# Patient Record
Sex: MALE | URBAN - METROPOLITAN AREA
[De-identification: names, ages, dates, MRNs, and addresses within clinical notes are randomized per-mention and may not be internally consistent; named-entity substitution may affect disease eponyms.]

---

## 2024-10-10 ENCOUNTER — OFFICE VISIT (OUTPATIENT)
Dept: PRIMARY CARE | Facility: CLINIC | Age: 24
End: 2024-10-10
Payer: COMMERCIAL

## 2024-10-10 VITALS
DIASTOLIC BLOOD PRESSURE: 68 MMHG | BODY MASS INDEX: 32.85 KG/M2 | HEIGHT: 74 IN | SYSTOLIC BLOOD PRESSURE: 128 MMHG | WEIGHT: 256 LBS

## 2024-10-10 DIAGNOSIS — R00.2 PALPITATION: ICD-10-CM

## 2024-10-10 DIAGNOSIS — I10 HYPERTENSION, UNSPECIFIED TYPE: ICD-10-CM

## 2024-10-10 DIAGNOSIS — K21.9 GASTROESOPHAGEAL REFLUX DISEASE, UNSPECIFIED WHETHER ESOPHAGITIS PRESENT: ICD-10-CM

## 2024-10-10 DIAGNOSIS — Z00.00 PHYSICAL EXAM: ICD-10-CM

## 2024-10-10 DIAGNOSIS — R07.9 CHEST PAIN, UNSPECIFIED TYPE: Primary | ICD-10-CM

## 2024-10-10 DIAGNOSIS — R94.31 ABNORMAL EKG: ICD-10-CM

## 2024-10-11 NOTE — PROGRESS NOTES
"Subjective   Patient ID: Jad Carroll is a 24 y.o. male who presents for New Patient Visit.    This 24-year-old black gentleman today came to my office first time, I welcomed him.  He told me he gets chest pain, palpitation, it lasts for several minutes, on and off he is getting.  He does not think he is too much stressed.  Appetite and weight are okay.  No problem.    IMMUNIZATION:  Tetanus within the last 10 years.    I have personally reviewed the patient's Past Medical History, Medications, Allergies, Social History, and Family History in the EMR.    Review of Systems   All other systems reviewed and are negative.    Objective   /68   Ht 1.88 m (6' 2\")   Wt 116 kg (256 lb)   BMI 32.87 kg/m²     Physical Exam  Vitals reviewed.   HENT:      Right Ear: Tympanic membrane, ear canal and external ear normal.      Left Ear: Tympanic membrane, ear canal and external ear normal.   Eyes:      General: No scleral icterus.     Pupils: Pupils are equal, round, and reactive to light.   Neck:      Vascular: No carotid bruit.   Cardiovascular:      Heart sounds: Normal heart sounds, S1 normal and S2 normal. No murmur heard.     No friction rub.      Comments: Heart sounds S1 and S2.  Pulmonary:      Effort: Pulmonary effort is normal.      Breath sounds: Normal breath sounds and air entry.   Abdominal:      Palpations: There is no hepatomegaly, splenomegaly or mass.   Genitourinary:     Comments: RECTAL:  Deferred by the patient.  Musculoskeletal:         General: No swelling or deformity. Normal range of motion.      Cervical back: Neck supple.      Right lower leg: No edema.      Left lower leg: No edema.   Lymphadenopathy:      Cervical: No cervical adenopathy.      Upper Body:      Right upper body: No axillary adenopathy.      Left upper body: No axillary adenopathy.      Lower Body: No right inguinal adenopathy. No left inguinal adenopathy.   Neurological:      Mental Status: He is oriented to person, place, " and time.      Cranial Nerves: Cranial nerves 2-12 are intact. No cranial nerve deficit.      Sensory: No sensory deficit.      Motor: Motor function is intact. No weakness.      Gait: Gait is intact.      Deep Tendon Reflexes: Reflexes normal.   Psychiatric:         Mood and Affect: Mood normal. Mood is not anxious or depressed. Affect is not angry.         Behavior: Behavior is not agitated.         Thought Content: Thought content normal.         Judgment: Judgment normal.     LAB WORK:  EKG done.    Assessment/Plan   Problem List Items Addressed This Visit    None  Visit Diagnoses         Codes    Chest pain, unspecified type    -  Primary R07.9    Abnormal EKG     R94.31    Relevant Orders    Transthoracic Echo Complete    Holter Or Event Cardiac Monitor    Hypertension, unspecified type     I10    Relevant Orders    ECG 12 Lead    Physical exam     Z00.00    Relevant Orders    CBC    Comprehensive Metabolic Panel    Hemoglobin A1C    Lipid Panel    Urinalysis with Reflex Culture and Microscopic    Thyroid Stimulating Hormone    Palpitation     R00.2    Gastroesophageal reflux disease, unspecified whether esophagitis present     K21.9        1. Chest pain, palpitation.  EKG okay.  I doubt it is cardiac.  2. GERD, on PPI.  3. After detailed discussion, I ordered echocardiogram, Holter.  Complete blood work ordered.  4. Follow up in a week after test.  5. Reassured to take Prilosec.  6. If situation comes back he will go to emergency room and see me right away.  7. Continue to follow.  8. The patient is nonsmoker.  I doubt we need EKG, chest x-ray, but I will keep an eye.    Scribe Attestation  By signing my name below, IAshanti, Scribsanjuana attest that this documentation has been prepared under the direction and in the presence of Mundo Romo MD.